# Patient Record
Sex: FEMALE
[De-identification: names, ages, dates, MRNs, and addresses within clinical notes are randomized per-mention and may not be internally consistent; named-entity substitution may affect disease eponyms.]

---

## 2021-04-01 ENCOUNTER — RX ONLY (RX ONLY)
Age: 21
End: 2021-04-01

## 2021-07-12 ENCOUNTER — RX ONLY (RX ONLY)
Age: 21
End: 2021-07-12

## 2022-02-07 ENCOUNTER — RX ONLY (RX ONLY)
Age: 22
End: 2022-02-07

## 2024-11-01 ENCOUNTER — HOSPITAL ENCOUNTER (EMERGENCY)
Age: 24
Discharge: HOME | End: 2024-11-01
Attending: EMERGENCY MEDICINE

## 2024-11-01 VITALS
RESPIRATION RATE: 18 BRPM | OXYGEN SATURATION: 99 % | DIASTOLIC BLOOD PRESSURE: 79 MMHG | TEMPERATURE: 97.3 F | HEART RATE: 89 BPM | WEIGHT: 293 LBS | SYSTOLIC BLOOD PRESSURE: 131 MMHG

## 2024-11-01 DIAGNOSIS — N39.0 ACUTE LOWER URINARY TRACT INFECTION: Primary | ICD-10-CM

## 2024-11-01 LAB
BACTERIA URNS QL MICRO: ABNORMAL
BILIRUB UR QL STRIP: NEGATIVE
CLARITY UR: CLEAR
COLOR UR: YELLOW
GLUCOSE UR STRIP-MCNC: NEGATIVE MG/DL
HCG UR QL: NEGATIVE
HGB UR QL STRIP.AUTO: ABNORMAL
KETONES UR STRIP-MCNC: NEGATIVE MG/DL
LEUKOCYTE ESTERASE UR QL STRIP: ABNORMAL
NITRITE UR QL STRIP: POSITIVE
PH UR STRIP: 6 [PH] (ref 5–8)
PROT UR STRIP-MCNC: 100 MG/DL
RBC #/AREA URNS HPF: ABNORMAL /HPF
SP GR UR STRIP: 1.02 (ref 1–1.03)
UROBILINOGEN UR STRIP-ACNC: 0.2 EU/DL (ref 0–1)
WBC #/AREA URNS HPF: ABNORMAL /HPF

## 2024-11-01 PROCEDURE — 6370000000 HC RX 637 (ALT 250 FOR IP): Performed by: EMERGENCY MEDICINE

## 2024-11-01 PROCEDURE — 81001 URINALYSIS AUTO W/SCOPE: CPT

## 2024-11-01 PROCEDURE — 87186 SC STD MICRODIL/AGAR DIL: CPT

## 2024-11-01 PROCEDURE — 87077 CULTURE AEROBIC IDENTIFY: CPT

## 2024-11-01 PROCEDURE — 87086 URINE CULTURE/COLONY COUNT: CPT

## 2024-11-01 PROCEDURE — 84703 CHORIONIC GONADOTROPIN ASSAY: CPT

## 2024-11-01 PROCEDURE — 99283 EMERGENCY DEPT VISIT LOW MDM: CPT

## 2024-11-01 RX ORDER — PHENAZOPYRIDINE HYDROCHLORIDE 100 MG/1
200 TABLET, FILM COATED ORAL ONCE
Status: COMPLETED | OUTPATIENT
Start: 2024-11-01 | End: 2024-11-01

## 2024-11-01 RX ORDER — IBUPROFEN 600 MG/1
600 TABLET, FILM COATED ORAL 3 TIMES DAILY PRN
Qty: 30 TABLET | Refills: 0 | Status: SHIPPED | OUTPATIENT
Start: 2024-11-01

## 2024-11-01 RX ORDER — PHENAZOPYRIDINE HYDROCHLORIDE 200 MG/1
200 TABLET, FILM COATED ORAL 3 TIMES DAILY PRN
Qty: 9 TABLET | Refills: 0 | Status: SHIPPED | OUTPATIENT
Start: 2024-11-01 | End: 2024-11-04

## 2024-11-01 RX ORDER — CEPHALEXIN 500 MG/1
500 CAPSULE ORAL 2 TIMES DAILY
Qty: 14 CAPSULE | Refills: 0 | Status: SHIPPED | OUTPATIENT
Start: 2024-11-01 | End: 2024-11-04

## 2024-11-01 RX ADMIN — PHENAZOPYRIDINE 200 MG: 100 TABLET ORAL at 20:56

## 2024-11-01 RX ADMIN — CEPHALEXIN 500 MG: 250 CAPSULE ORAL at 20:56

## 2024-11-02 NOTE — DISCHARGE INSTR - COC
Continuity of Care Form    Patient Name: Nora Cordero   :  2000  MRN:  [id number]    Admit date:  2024  Discharge date:  ***    Code Status Order: No Order   Advance Directives:   Advance Care Flowsheet Documentation             Admitting Physician:  No admitting provider for patient encounter.  PCP: Neetu Fatima PA-C    Discharging Nurse: ***  Discharging Hospital Unit/Room#: ED-/E06  Discharging Unit Phone Number: ***    Emergency Contact:   Extended Emergency Contact Information  Primary Emergency Contact: TODD BALL  Home Phone: [phone]  Relation: Parent  Preferred language: English   needed? No    Past Surgical History:  History reviewed. No pertinent surgical history.    Immunization History:   Immunization History   Administered Date(s) Administered    COVID-19, PFIZER PURPLE top, DILUTE for use, (age 12 y+), 30mcg/0.3mL 2021, 2021       Active Problems:  There is no problem list on file for this patient.      Isolation/Infection:   Isolation            No Isolation          Patient Infection Status       None to display            Nurse Assessment:  Last Vital Signs: /79   Pulse 89   Temp 97.3 °F (36.3 °C)   Resp 18   Wt (!) 147.9 kg (326 lb)   LMP  (LMP Unknown)   SpO2 99%     Last documented pain score (0-10 scale):    Last Weight:   Wt Readings from Last 1 Encounters:   24 (!) 147.9 kg (326 lb)     Mental Status:  {IP PT MENTAL STATUS:64334}    IV Access:  { EARL IV ACCESS:485149994}    Nursing Mobility/ADLs:  Walking   {CHP DME ADLs:831002331}  Transfer  {CHP DME ADLs:195519824}  Bathing  {CHP DME ADLs:239565356}  Dressing  {CHP DME ADLs:312072262}  Toileting  {CHP DME ADLs:635100158}  Feeding  {CHP DME ADLs:701590314}  Med Admin  {CHP DME ADLs:663806747}  Med Delivery   { EARL MED Delivery:661717664}    Wound Care Documentation and Therapy:        Elimination:  Continence:   Bowel: {YES / NO:}  Bladder: {YES / NO:}  Urinary

## 2024-11-02 NOTE — ED PROVIDER NOTES
Emergency Department Encounter    Patient: Nora Cordero  MRN: [id number]  : 2000  Date of Evaluation: 2024  ED Provider:  Morelia Starks DO    Triage Chief Complaint:   Dysuria    Robinson:  Nora Cordero is a 24 y.o. female history of morbid obesity, hypertension, hyperlipidemia, acid reflux, anxiety, depression, insomnia that presents to the emergency department complaining of dysuria.  Patient states she has had burning with urination last 3 days.  Patient states she feels she has a urinary tract infection again.  Patient states she was recently treated for urinary tract infection once in August and September of this year.  Patient that she feels the symptoms have come back.  Patient states some suprapubic abdominal pressure.  She denies any nausea vomiting diarrhea no fever chills cough no sore throat runny nose earache.  She states she has not called to follow-up with her primary care physician.  Patient here for evaluation.    ROS - see HPI, below listed is current ROS at time of my eval:  10 systems reviewed and negative except as above.     History reviewed. No pertinent past medical history.  History reviewed. No pertinent surgical history.  History reviewed. No pertinent family history.  Social History     Socioeconomic History    Marital status: Single     Spouse name: Not on file    Number of children: Not on file    Years of education: Not on file    Highest education level: Not on file   Occupational History    Not on file   Tobacco Use    Smoking status: Never    Smokeless tobacco: Never   Vaping Use    Vaping status: Never Used   Substance and Sexual Activity    Alcohol use: Never    Drug use: Never    Sexual activity: Not on file   Other Topics Concern    Not on file   Social History Narrative    Not on file     Social Determinants of Health     Financial Resource Strain: Not on file   Food Insecurity: Not on file   Transportation Needs: Not on file   Physical Activity: Not on file

## 2024-11-02 NOTE — DISCHARGE INSTRUCTIONS
Follow-up with urologist Dr. Rivera in for evaluation of recurrent urinary tract infection.  Call Monday for an appointment  Follow-up with your primary care physician in 3 days for reevaluation  Take Keflex antibiotic as prescribed and directed while awaiting culture  Take Pyridium as described for bladder pain and spasm.  It may turn urine orange  Take Tylenol alternate with Motrin for any pain aches and fevers  Return to the emergency department immediately any pain fever chills nausea vomiting dizzy lightheadedness or any worsening symptoms.

## 2024-11-03 LAB
MICROORGANISM SPEC CULT: ABNORMAL
SERVICE CMNT-IMP: ABNORMAL
SPECIMEN DESCRIPTION: ABNORMAL

## 2024-11-04 ENCOUNTER — TELEPHONE (OUTPATIENT)
Dept: PHARMACY | Age: 24
End: 2024-11-04

## 2024-11-04 LAB
MICROORGANISM SPEC CULT: ABNORMAL
SERVICE CMNT-IMP: ABNORMAL
SPECIMEN DESCRIPTION: ABNORMAL

## 2024-11-04 RX ORDER — SULFAMETHOXAZOLE AND TRIMETHOPRIM 800; 160 MG/1; MG/1
1 TABLET ORAL 2 TIMES DAILY
Qty: 14 TABLET | Refills: 0 | Status: SHIPPED | OUTPATIENT
Start: 2024-11-04 | End: 2024-11-11

## 2024-11-04 NOTE — PROGRESS NOTES
Pharmacy Note  ED Culture Follow-up    Nora Cordero is a 24 y.o. female.     Allergies: Bupropion     Labs:  No results found for: \"BUN\", \"CREATININE\", \"WBC\"  CrCl cannot be calculated (No successful lab value found.).    Current antimicrobials:   Cephalexin    ASSESSMENT:  Micro results:   Urine culture: positive for E. Coli >100,000 CFU/ml     PLAN:  Need for intervention: Yes  Discussed with: Dr. Vizcarra  Chosen treatment:    Informed patient of urine culture result. Instructed patient to discontinue cephalexin and initiate Bactrim.    Patient response:   Called and spoke with patient.    Counseled patient on following up with urology or PCP    Called/sent in prescription to:  [facility]    Please call with any questions. Ext. [phone]    Armida Garcia RPH, PharmD 3:15 PM 11/4/2024

## 2024-11-04 NOTE — TELEPHONE ENCOUNTER
Pharmacy Note  ED Culture Follow-up    Nora Cordero is a 24 y.o. female.     Allergies: Bupropion     Labs:  No results found for: \"BUN\", \"CREATININE\", \"WBC\"  CrCl cannot be calculated (No successful lab value found.).    Current antimicrobials:   Cephalexin    ASSESSMENT:  Micro results:   Urine culture: positive for E. Coli >100,000 CFU/ml     PLAN:  Need for intervention: Yes  Discussed with: Dr. Vizcarra  Chosen treatment:    Informed patient of urine culture result. Instructed patient to discontinue cephalexin and initiate Bactrim.    Patient response:   Called and spoke with patient.   Counseled patient on following up with urology or PCP    Called/sent in prescription to: [facility]    Please call with any questions. Ext. [phone]    Armida Garcia RPH, PharmD 3:15 PM 11/4/2024